# Patient Record
Sex: MALE | Race: WHITE
[De-identification: names, ages, dates, MRNs, and addresses within clinical notes are randomized per-mention and may not be internally consistent; named-entity substitution may affect disease eponyms.]

---

## 2018-02-24 ENCOUNTER — HOSPITAL ENCOUNTER (OUTPATIENT)
Dept: HOSPITAL 45 - C.RAD1850 | Age: 78
Discharge: HOME | End: 2018-02-24
Attending: FAMILY MEDICINE
Payer: COMMERCIAL

## 2018-02-24 DIAGNOSIS — S22.32XD: Primary | ICD-10-CM
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
## 2018-02-24 NOTE — DIAGNOSTIC IMAGING REPORT
CHEST 2 VIEWS ROUTINE



CLINICAL HISTORY: Fall. Left chest pain. Rib fracture.    



COMPARISON STUDY:  No previous studies for comparison.



FINDINGS: There is a small left pleural effusion. There is linear left basilar

opacity suggestive of atelectasis. There is no pneumothorax. An 2 left sixth rib

fracture is better depicted on the left rib series. There is no evidence for

pulmonary edema. There is borderline cardiomegaly. 



IMPRESSION:  



1. No pneumothorax.



2. Acute mildly displaced left sixth rib fracture which is better depicted on

the left rib series. Small left pleural effusion which may reflect a hemothorax

given rib fracture. Left basilar opacity suggestive of atelectasis. 









Electronically signed by:  Ady Koroma M.D.

2/24/2018 10:15 AM



Dictated Date/Time:  2/24/2018 10:10 AM

## 2018-02-24 NOTE — DIAGNOSTIC IMAGING REPORT
L RIBS UNILATERAL MIN 2 VIEWS



CLINICAL HISTORY: Left-sided rib pain following fall.    



COMPARISON STUDY:  No previous studies for comparison.



FINDINGS: There is an acute mildly displaced fracture of the left lateral sixth

rib. A few old left rib fractures are noted. There is a small left pleural

effusion with left basilar opacity. There may be calcific tendinitis of the left

rotator cuff. No pneumothorax is visualized.



IMPRESSION:  



1. Acute mildly displaced fracture of the lateral left sixth rib. No

pneumothorax.



2. Small left pleural effusion which may reflect a hemothorax given the rib

fracture. Left basilar opacity suggestive of atelectasis.









Electronically signed by:  Ady Koroma M.D.

2/24/2018 10:18 AM



Dictated Date/Time:  2/24/2018 10:15 AM

## 2018-08-06 ENCOUNTER — HOSPITAL ENCOUNTER (OUTPATIENT)
Dept: HOSPITAL 45 - C.RAD1850 | Age: 78
Discharge: HOME | End: 2018-08-06
Attending: FAMILY MEDICINE
Payer: COMMERCIAL

## 2018-08-06 DIAGNOSIS — R06.02: Primary | ICD-10-CM

## 2018-08-06 DIAGNOSIS — I25.10: ICD-10-CM

## 2018-08-06 NOTE — DIAGNOSTIC IMAGING REPORT
CHEST 2 VIEWS ROUTINE



CLINICAL HISTORY: EXERTIONAL DYSPNEA dyspnea



COMPARISON STUDY:  2/24/2018



FINDINGS: Chronic atelectasis versus chronic pleural reactive changes left base.

Lungs otherwise appear clear. Diaphragms are smooth. No significant cardiac

enlargement. 



IMPRESSION:  Chronic change left base. No acute process. 











The above report was generated using voice recognition software.  It may contain

grammatical, syntax or spelling errors.









Electronically signed by:  James Lee M.D.

8/6/2018 1:16 PM



Dictated Date/Time:  8/6/2018 1:15 PM